# Patient Record
Sex: MALE | URBAN - METROPOLITAN AREA
[De-identification: names, ages, dates, MRNs, and addresses within clinical notes are randomized per-mention and may not be internally consistent; named-entity substitution may affect disease eponyms.]

---
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
## 2024-04-30 ENCOUNTER — APPOINTMENT (OUTPATIENT)
Dept: RADIOLOGY | Facility: HOSPITAL | Age: 24
End: 2024-04-30
Payer: COMMERCIAL

## 2024-04-30 ENCOUNTER — HOSPITAL ENCOUNTER (EMERGENCY)
Facility: HOSPITAL | Age: 24
Discharge: HOME | End: 2024-04-30
Payer: COMMERCIAL

## 2024-04-30 VITALS
RESPIRATION RATE: 19 BRPM | TEMPERATURE: 98.4 F | BODY MASS INDEX: 21.47 KG/M2 | WEIGHT: 150 LBS | SYSTOLIC BLOOD PRESSURE: 119 MMHG | HEIGHT: 70 IN | HEART RATE: 81 BPM | DIASTOLIC BLOOD PRESSURE: 74 MMHG | OXYGEN SATURATION: 100 %

## 2024-04-30 DIAGNOSIS — M79.671 RIGHT FOOT PAIN: Primary | ICD-10-CM

## 2024-04-30 DIAGNOSIS — M25.571 ACUTE RIGHT ANKLE PAIN: ICD-10-CM

## 2024-04-30 PROCEDURE — 73610 X-RAY EXAM OF ANKLE: CPT | Mod: RIGHT SIDE | Performed by: RADIOLOGY

## 2024-04-30 PROCEDURE — 2500000001 HC RX 250 WO HCPCS SELF ADMINISTERED DRUGS (ALT 637 FOR MEDICARE OP): Performed by: NURSE PRACTITIONER

## 2024-04-30 PROCEDURE — 99284 EMERGENCY DEPT VISIT MOD MDM: CPT | Mod: 25

## 2024-04-30 PROCEDURE — 73630 X-RAY EXAM OF FOOT: CPT | Mod: RT

## 2024-04-30 PROCEDURE — 73610 X-RAY EXAM OF ANKLE: CPT | Mod: RT

## 2024-04-30 PROCEDURE — 73630 X-RAY EXAM OF FOOT: CPT | Mod: RIGHT SIDE | Performed by: RADIOLOGY

## 2024-04-30 RX ORDER — HYDROCODONE BITARTRATE AND ACETAMINOPHEN 5; 325 MG/1; MG/1
1 TABLET ORAL 3 TIMES DAILY PRN
Qty: 9 TABLET | Refills: 0 | Status: SHIPPED | OUTPATIENT
Start: 2024-04-30 | End: 2024-05-03

## 2024-04-30 RX ORDER — OXYCODONE AND ACETAMINOPHEN 5; 325 MG/1; MG/1
1 TABLET ORAL ONCE
Status: COMPLETED | OUTPATIENT
Start: 2024-04-30 | End: 2024-04-30

## 2024-04-30 RX ORDER — NALOXONE HYDROCHLORIDE 4 MG/.1ML
4 SPRAY NASAL AS NEEDED
Qty: 1 EACH | Refills: 1 | Status: SHIPPED | OUTPATIENT
Start: 2024-04-30 | End: 2025-04-30

## 2024-04-30 RX ORDER — IBUPROFEN 400 MG/1
400 TABLET ORAL EVERY 6 HOURS PRN
Qty: 30 TABLET | Refills: 0 | Status: SHIPPED | OUTPATIENT
Start: 2024-04-30 | End: 2024-05-07

## 2024-04-30 RX ADMIN — OXYCODONE HYDROCHLORIDE AND ACETAMINOPHEN 1 TABLET: 5; 325 TABLET ORAL at 21:14

## 2024-04-30 ASSESSMENT — COLUMBIA-SUICIDE SEVERITY RATING SCALE - C-SSRS
6. HAVE YOU EVER DONE ANYTHING, STARTED TO DO ANYTHING, OR PREPARED TO DO ANYTHING TO END YOUR LIFE?: NO
1. IN THE PAST MONTH, HAVE YOU WISHED YOU WERE DEAD OR WISHED YOU COULD GO TO SLEEP AND NOT WAKE UP?: NO
2. HAVE YOU ACTUALLY HAD ANY THOUGHTS OF KILLING YOURSELF?: NO

## 2024-05-01 NOTE — DISCHARGE INSTRUCTIONS
Elevate   Continue to use the cast show and non weight bearing until seen by Dr Mendelzon on Thursday  Use the pain meds as prescribed  No work or driving while on pain meds

## 2024-05-01 NOTE — ED PROVIDER NOTES
[facility]  ED  Encounter Note  Admit Date/RoomTime: 2024  8:28 PM  ED Room: [id number]  NAME: Jaret Gutierrez  : 2000  MRN: [id number]     Chief Complaint:  Toe Injury (Kicked metal sheet yesterday afternoon, now c/o right first toe pain and bruising. )    HISTORY OF PRESENT ILLNESS        Jaret Gutierrez is a 23 y.o. male who presents to the ED for evaluation of right foot and ankle pain. Patient stated that he kicked a piece of metal sheet yesterday and now has increased right great toe pain, bruising as well as ankle and foot pain. Denied any medications. Did have right ankle reconstruction though a year ago by Dr Mendelzoon.     ROS   Pertinent positives and negatives are stated within HPI, all other systems reviewed and are negative.    Past Medical History:  has a past medical history of Acute tonsillitis, unspecified (2013), Personal history of diseases of the skin and subcutaneous tissue (10/09/2015), Personal history of other diseases of the respiratory system (10/19/2015), and Personal history of other mental and behavioral disorders (2015). celiac    Surgical History:  has no past surgical history on file. Right ankle reconstruction    Social History:   denied nicotine alcohol or street drug use    Family History: family history is not on file.     Allergies: Patient has no known allergies.    PHYSICAL EXAM   Oxygen Saturation Interpretation: Normal.        Physical Exam  Constitutional/General: Alert and oriented x3, well appearing, non toxic  HEENT:  NC/NT. PERRLA.  Airway patent.  Neck: Supple, full ROM. No midline vertebral tenderness or crepitus.   Respiratory: Lung sounds clear to auscultation bilaterally. No wheezes, rhonchi or stridor. Not in respiratory distress.  CV:  Regular rate. Regular rhythm. No murmurs or rubs. 2+ distal pulses.  GI:  Abdomen soft, non-tender, non-distended. +BS. No rebound, guarding, or rigidity. No pulsatile  masses.  Musculoskeletal: Moves all extremities x 4. Warm and well perfused. Capillary refill <3 seconds  Integument: Skin warm and dry. No rashes.   Neurologic: Alert and oriented with no focal deficits, symmetric strength 5/5 in the upper and lower extremities bilaterally.  Psychiatric: Normal affect.//right first great toe bruising and and swelling to right ankle pain    Lab / Imaging Results   (All laboratory and radiology results have been personally reviewed by myself)  Labs:  Results for orders placed or performed in visit on 02/09/21   Alanine Aminotransferase   Result Value Ref Range    ALT (SGPT) 7 5 - 40 U/L   Aspartate Aminotransferase   Result Value Ref Range    AST 18 5 - 40 U/L   CBC and Differential   Result Value Ref Range    Differential Type AUTO DIFF     Immature Granulocyte %, Automated 0.20 0.0 - 1.0 %    Neutrophil 71.50 (H) 50 - 70 %    Lymphocytes % 18.70 (L) 20 - 40 %    Monocytes % 8.60 (H) 0 - 8 %    Eosinophil 0.50 0 - 3 %    Basophils % 0.50 0 - 1 %    Immature Granulocytes Absolute, Automated 0.02 0.0 - 0.1 K/UL    Neutrophils Absolute 5.73 1.5 - 8.0 K/UL    Lymphocytes Absolute 1.50 1.2 - 3.2 K/UL    Monocytes Absolute 0.69 0 - 0.8 K/UL    Eosinophils Absolute 0.04 0 - 0.45 K/UL    Basophils Absolute 0.04 0.00 - 0.22 K/UL    WBC 8.0 4.5 - 11.0 K/UL    RBC 5.16 4.5 - 5.5 M/UL    Hemoglobin 15.6 13.5 - 16.5 GM/DL    Hematocrit 47.3 41 - 50 %    MCV 91.7 80 - 100 FL    MCH 30.2 26 - 34 PG    MCHC 33.0 31 - 37 %    RDW-SD 43.0 37.0 - 54.0 FL    RDW-CV 12.7 11.7 - 15.0 %    Platelets 266 150 - 450 K/UL    MPV 10.2 7.0 - 12.6 CU    nRBC 0 0 /100 WBC    ABSOLUTE NEUTROPHIL CALCULATED 5.73 K/UL   Lipid Panel   Result Value Ref Range    SERUM COLOR YELLOW     SERUM CLARITY CLEAR     Is the patient fasting? NON-FASTING     Cholesterol 111 (L) 115 - 170 MG/DL    Triglycerides 108 40 - 150 MG/DL    HDL 43 >40 MG/DL    LDL Calculated 46 (L) 65 - 130 MG/DL    Cholesterol/HDL Ratio 2.6 RATIO      Imaging:  All Radiology results interpreted by Radiologist unless otherwise noted.  XR foot right 3+ views   Final Result   Right Foot:  No acute fracture..   Right Ankle:  No acute fracture..   Signed by Jose Nunes MD      XR ankle right 3+ views   Final Result   Right Foot:  No acute fracture..   Right Ankle:  No acute fracture..   Signed by Jose Nunes MD          ED Course / Medical Decision Making     Medications   oxyCODONE-acetaminophen (Percocet) 5-325 mg per tablet 1 tablet (1 tablet oral Given 4/30/24 2114)     Diagnoses as of 04/30/24 2352   Right foot pain   Acute right ankle pain     Re-examination:    Patient’s condition stable.    Consult(s):   DR MENDELZOON CONSULTED IN ED    Procedure(s):   CAST SHOW AND CRUTCHES APPLIED BY NURSING    MDM:       Jaret Gutierrez is a 23 y.o. male who presents to the ED for evaluation of right foot and ankle pain. Patient stated that he kicked a piece of metal sheet yesterday and now has increased right great toe pain, bruising as well as ankle and foot pain. Denied any medications. Did have right ankle reconstruction though a year ago by Dr Mendelzoon.     Ed course still having pain despite pain meds.  Imaging of xray of right ankle and foot neg for acute fracture.  Dr Mendelzoon aware. Will agree to see on Thur at 730 am in [place], [place]. Pt to call tomorrow to confirm the appt.  Use pain meds as needed, small amount of pain meds.  Crutches and cast shoe applied per nursing.  Ddx: right ankle and foot pain      Plan of Care/Counseling:  I reviewed today's visit with the patient and friend   in addition to providing specific details for the plan of care and counseling regarding the diagnosis and prognosis.  Questions are answered at this time and are agreeable with the plan.    ASSESSMENT     1. Right foot pain    2. Acute right ankle pain      PLAN   Home Nonsteroidals, Tylenol, and Narcotic pain medication  Diagnostic tests were reviewed and questions  answered. Diagnosis, care plan and treatment options were discussed. The patient and FRIEND understand instructions and will follow up as directed.  Condition completed  The patient and FRIEND was given verbal follow-up instructions  Patient condition is good    New Medications     New Medications Ordered This Visit   Medications    oxyCODONE-acetaminophen (Percocet) 5-325 mg per tablet 1 tablet     Order Specific Question:   If ordered PRN for pain, nurse is permitted to administer this medication for higher pain scores based on patient preference?     Answer:   Yes    HYDROcodone-acetaminophen (Norco) 5-325 mg tablet     Sig: Take 1 tablet by mouth 3 times a day as needed for severe pain (7 - 10) for up to 3 days.     Dispense:  9 tablet     Refill:  0    naloxone (Narcan) 4 mg/0.1 mL nasal spray     Sig: Administer 1 spray (4 mg) into affected nostril(s) if needed for opioid reversal or respiratory depression for up to 1 dose. May repeat every 2-3 minutes if needed, alternating nostrils, until medical assistance becomes available.     Dispense:  1 each     Refill:  1    ibuprofen 400 mg tablet     Sig: Take 1 tablet (400 mg) by mouth every 6 hours if needed for moderate pain (4 - 6) for up to 7 days.     Dispense:  30 tablet     Refill:  0     Electronically signed by ART Lundy    **This report was transcribed using voice recognition software. Every effort was made to ensure accuracy; however, inadvertent computerized transcription errors may be present.  END OF ED PROVIDER NOTE     ART Ludny  04/30/24 [id number]